# Patient Record
Sex: MALE | URBAN - METROPOLITAN AREA
[De-identification: names, ages, dates, MRNs, and addresses within clinical notes are randomized per-mention and may not be internally consistent; named-entity substitution may affect disease eponyms.]

---

## 2017-12-27 ENCOUNTER — OFFICE VISIT - HEALTHEAST (OUTPATIENT)
Dept: FAMILY MEDICINE | Facility: CLINIC | Age: 47
End: 2017-12-27

## 2017-12-27 DIAGNOSIS — S92.009A CALCANEAL FRACTURE: ICD-10-CM

## 2017-12-27 DIAGNOSIS — Z00.00 WELLNESS EXAMINATION: ICD-10-CM

## 2017-12-27 LAB
CHOLEST SERPL-MCNC: 241 MG/DL
FASTING STATUS PATIENT QL REPORTED: YES
HDLC SERPL-MCNC: 42 MG/DL
LDLC SERPL CALC-MCNC: 93 MG/DL
LDLC SERPL CALC-MCNC: ABNORMAL MG/DL
TRIGL SERPL-MCNC: 471 MG/DL

## 2017-12-27 ASSESSMENT — MIFFLIN-ST. JEOR: SCORE: 1666.2

## 2017-12-28 ENCOUNTER — COMMUNICATION - HEALTHEAST (OUTPATIENT)
Dept: FAMILY MEDICINE | Facility: CLINIC | Age: 47
End: 2017-12-28

## 2017-12-28 ENCOUNTER — OFFICE VISIT - HEALTHEAST (OUTPATIENT)
Dept: PODIATRY | Age: 47
End: 2017-12-28

## 2017-12-28 DIAGNOSIS — M77.50 ANKLE ENTHESOPATHY: ICD-10-CM

## 2017-12-28 ASSESSMENT — MIFFLIN-ST. JEOR: SCORE: 1664.79

## 2021-05-31 VITALS — HEIGHT: 68 IN | WEIGHT: 187 LBS | BODY MASS INDEX: 28.34 KG/M2

## 2021-05-31 VITALS — BODY MASS INDEX: 28.39 KG/M2 | WEIGHT: 187.31 LBS | HEIGHT: 68 IN

## 2021-06-15 NOTE — PROGRESS NOTES
DATE OF SERVICE: 12/27/2017    SUBJECTIVE:  Very pleasant new patient presents today for physical examination.  He  has never had a physical exam in the recent past according to his recollection.   Never had blood drawn.  Socially, he is originally from [place], and works at the  [employer] and is on his feet 8-9 hours per day.  He denies chest pain  or problems breathing.  ROS negative for chest pain or problems breathing.    FAMILY HISTORY:  Reviewed and completely negative.      SOCIAL HISTORY:  He is , does not smoke.    PAST SURGICAL HISTORY:  Notable for appendectomy.      REVIEW OF SYSTEMS:  Otherwise completely negative.    He has a new problem today of pain in his left ankle.  He brings documentation  showing that he has been seen by chiropractors in the past and head MRI showing  calcaneal fracture and ligament disruption in the left ankle.  This apparently  occurred after trauma from a fall approximately 3 months ago.  He has not seen a  physician for this ailment.  He brings MRI findings with him.    OBJECTIVE:  Examination shows blood pressure 120/74, pulse 70, respirations 16.  The  HEENT shows TMs clear, sclerae not injected pharynx nonerythematous.  Neck is  supple, no lymphadenopathy, no thyromegaly.  Lungs clear to auscultation.  Regular  rhythm, normal S1, normal, S2.  Abdomen soft, nontender, no masses.  Skin pink and  dry.  Examination of the left ankle shows pain to palpation over the calcaneus.   There is mild swelling both over the lateral and medial malleolus.  Normal distal  neurological and vascular function of left lower extremity.      Review of the MRI findings show possible calcaneal fracture with ligament  disruption.    ASSESSMENT:    1.  Calcaneal fracture.  2.  Ligament disruption.  3.  Healthy male.    PLAN:  1.  CBC, CMP, lipid panel.  2.  Encouraged healthy lifestyle and health maintenance issues were noted.   Immunizations in lead to be updated at some  point.  There is no current  documentation at this time.  3.  Referral to podiatry for ankle fracture.  4.  Continue in a walking boot for comfort.  5.  Patient will follow up after seeing Dr. Centeno.      MD lisandro VIVEROS 12/27/2017 11:22:50  T 12/27/2017 11:50:05  R 12/27/2017 11:50:05  [id number]        cc:WANDY ROSALES MD

## 2021-06-15 NOTE — PROGRESS NOTES
"ASSESSMENT: Left ankle sprain with bone contusion of the calcaneus and talus    PLAN: We will immobilize this with a postoperative boot for a month ago and repeat the Medrol Dosepak and see much relief that provides.  MRI report is vague with its talk of \"possible fracture\".  Nothing is displaced, and there is no evidence of posttraumatic arthritis yet.  Most of his pain is along the lateral side of the ankle, consistent with peroneal tendinitis.  He will contact the clinic in 4-6 weeks if symptoms persist.      SUBJECTIVE: New patient visit at the [facility] regarding left ankle pain following jumping from 3 foot height and inverting the left ankle about a year ago.  Treated by a chiropractor initially.  Pain persisted, and was worse with his standing job.  An MRI was done through [facility] which unhelpfully describes a \"possible fracture\" of the calcaneus and talus.  No fracture line is seen.  No dislocation.  Lately, the patient rarely sees swelling.  When he has pain it is along the lateral aspect of the ankle.  An over-the-counter ankle brace has been useful.    PHYSICAL EXAM:  /70  Pulse 77  Resp 14  Ht 5' 7.5\" (1.715 m)  Wt 187 lb (84.8 kg)  SpO2 100%  BMI 28.86 kg/m2  General: Pleasant 47 y.o. male in no acute distress.  Vascular: DP pulses are palpable. PT pulses are palpable. Pedal hair is present. Feet are warm to the touch.  Cardiac: Pulse is regular.  Lymphatic: No edema at the ankles.  Neuro: Sensation in the feet is grossly intact to light touch.  Derm: No open lesions.  Musculoskeletal: Some tenderness with eversion against resistance.  Tenderness along the course of the peroneal tendons on the left lateral ankle.  No crepitus with ankle or subtalar joint range of motion.  No pain with compression of the posterior tubercle.  Outside MRI report is reviewed.    No past medical history on file.    He has no past surgical history on file.    No Known Allergies    Current Outpatient " Prescriptions   Medication Sig Dispense Refill     methylPREDNISolone (MEDROL, MELCHOR,) 4 mg tablet follow package directions 1 tablet 0     No current facility-administered medications for this visit.        Family History:  family history is not on file.    Social History:  Reviewed, and he reports that he has never smoked. He has never used smokeless tobacco. He reports that he does not drink alcohol or use illicit drugs.    Review of Systems:  A 12 point comprehensive review of systems was negative except as noted.

## 2021-07-05 ENCOUNTER — AMBULATORY - HEALTHEAST (OUTPATIENT)
Dept: NURSING | Facility: CLINIC | Age: 51
End: 2021-07-05

## 2021-07-26 ENCOUNTER — IMMUNIZATION (OUTPATIENT)
Dept: NURSING | Facility: CLINIC | Age: 51
End: 2021-07-26
Attending: PEDIATRICS
Payer: OTHER GOVERNMENT

## 2021-07-26 DIAGNOSIS — Z23 ENCOUNTER FOR IMMUNIZATION: ICD-10-CM

## 2021-07-26 PROCEDURE — 91300 PR COVID VAC PFIZER DIL RECON 30 MCG/0.3 ML IM: CPT

## 2021-07-26 PROCEDURE — 0002A PR COVID VAC PFIZER DIL RECON 30 MCG/0.3 ML IM: CPT
